# Patient Record
Sex: FEMALE | Race: WHITE
[De-identification: names, ages, dates, MRNs, and addresses within clinical notes are randomized per-mention and may not be internally consistent; named-entity substitution may affect disease eponyms.]

---

## 2018-09-24 ENCOUNTER — HOSPITAL ENCOUNTER (OUTPATIENT)
Dept: HOSPITAL 84 - D.US | Age: 63
Discharge: HOME | End: 2018-09-24
Attending: INTERNAL MEDICINE
Payer: COMMERCIAL

## 2018-09-24 VITALS — BODY MASS INDEX: 37.4 KG/M2

## 2018-09-24 DIAGNOSIS — R12: Primary | ICD-10-CM

## 2018-09-24 DIAGNOSIS — R79.89: ICD-10-CM

## 2018-09-24 DIAGNOSIS — R13.10: ICD-10-CM

## 2018-09-24 LAB
ALBUMIN SERPL-MCNC: 3.4 G/DL (ref 3.4–5)
ALP SERPL-CCNC: 119 U/L (ref 46–116)
ALT SERPL-CCNC: 179 U/L (ref 10–68)
BILIRUB DIRECT SERPL-MCNC: 0.32 MG/DL (ref 0–0.3)
BILIRUB INDIRECT SERPL-MCNC: 0.6 MG/DL (ref 0–1)
BILIRUB SERPL-MCNC: 0.92 MG/DL (ref 0.2–1.3)
GLOBULIN SER-MCNC: 4.5 G/L
PROT SERPL-MCNC: 7.9 G/DL (ref 6.4–8.2)

## 2018-09-25 LAB — HCV AB S/CO SERPL IA: <0.1 (ref 0–0.9)

## 2018-10-01 ENCOUNTER — HOSPITAL ENCOUNTER (OUTPATIENT)
Dept: HOSPITAL 84 - D.LAB | Age: 63
Discharge: HOME | End: 2018-10-01
Attending: INTERNAL MEDICINE
Payer: COMMERCIAL

## 2018-10-01 VITALS — BODY MASS INDEX: 44.9 KG/M2 | BODY MASS INDEX: 37.4 KG/M2

## 2018-10-01 DIAGNOSIS — R79.89: Primary | ICD-10-CM

## 2018-10-01 LAB
ANION GAP SERPL CALC-SCNC: 10.1 MMOL/L (ref 8–16)
APTT BLD: 33.4 SECONDS (ref 22.8–39.4)
BASOPHILS NFR BLD AUTO: 0.4 % (ref 0–2)
BUN SERPL-MCNC: 9 MG/DL (ref 7–18)
CALCIUM SERPL-MCNC: 8.9 MG/DL (ref 8.5–10.1)
CHLORIDE SERPL-SCNC: 103 MMOL/L (ref 98–107)
CHOLEST/HDLC SERPL: 2.6 RATIO (ref 2.3–4.1)
CO2 SERPL-SCNC: 28.7 MMOL/L (ref 21–32)
CREAT SERPL-MCNC: 1.1 MG/DL (ref 0.6–1.3)
EOSINOPHIL NFR BLD: 2.9 % (ref 0–7)
ERYTHROCYTE [DISTWIDTH] IN BLOOD BY AUTOMATED COUNT: 14.1 % (ref 11.5–14.5)
FERRITIN SERPL-MCNC: 1259 NG/ML (ref 3–244)
GGT SERPL-CCNC: 99 U/L (ref 5–85)
GLUCOSE SERPL-MCNC: 127 MG/DL (ref 74–106)
HCT VFR BLD CALC: 41 % (ref 36–48)
HDLC SERPL-MCNC: 38 MG/DL (ref 32–96)
HGB BLD-MCNC: 14.5 G/DL (ref 12–16)
IMM GRANULOCYTES NFR BLD: 0 % (ref 0–5)
INR PPP: 1.24 (ref 0.85–1.17)
IRON SERPL-MCNC: 77 UG/DL (ref 35–150)
LDL-HDL RATIO: 1.1 RATIO (ref 1.5–3.5)
LDLC SERPL-MCNC: 42 MG/DL (ref 0–100)
LYMPHOCYTES NFR BLD AUTO: 24.1 % (ref 15–50)
MCH RBC QN AUTO: 32.6 PG (ref 26–34)
MCHC RBC AUTO-ENTMCNC: 35.4 G/DL (ref 31–37)
MCV RBC: 92.1 FL (ref 80–100)
MONOCYTES NFR BLD: 8.1 % (ref 2–11)
NEUTROPHILS NFR BLD AUTO: 64.5 % (ref 40–80)
OSMOLALITY SERPL CALC.SUM OF ELEC: 276 MOSM/KG (ref 275–300)
PLATELET # BLD: 205 10X3/UL (ref 130–400)
PMV BLD AUTO: 11 FL (ref 7.4–10.4)
POTASSIUM SERPL-SCNC: 3.8 MMOL/L (ref 3.5–5.1)
PROTHROMBIN TIME: 15.2 SECONDS (ref 11.6–15)
RBC # BLD AUTO: 4.45 10X6/UL (ref 4–5.4)
SAO2 % BLD FROM PO2: 27 % (ref 15–55)
SODIUM SERPL-SCNC: 138 MMOL/L (ref 136–145)
TIBC SERPL-MCNC: 281 UG/DL (ref 260–445)
TRIGL SERPL-MCNC: 93 MG/DL (ref 30–200)
UIBC SERPL-MCNC: 204 UG/DL (ref 150–375)
WBC # BLD AUTO: 5.4 10X3/UL (ref 4.8–10.8)

## 2018-10-02 LAB
AFP-TM SERPL-MCNC: 7.8 NG/ML (ref 0–8.3)
FOLATE SERPL-MCNC: 11 NG/ML (ref 3–?)
HAPTOGLOB SERPL-MCNC: 145 MG/DL (ref 34–200)
VIT B12 SERPL-MCNC: 847 PG/ML (ref 232–1245)

## 2018-10-05 LAB — MITOCHONDRIA M2 IGG SER-ACNC: 14 UNITS (ref 0–20)

## 2018-10-09 LAB — ACTIN IGG SERPL-ACNC: (no result)

## 2018-10-17 ENCOUNTER — HOSPITAL ENCOUNTER (OUTPATIENT)
Dept: HOSPITAL 84 - D.SP | Age: 63
Discharge: HOME | End: 2018-10-17
Attending: INTERNAL MEDICINE
Payer: COMMERCIAL

## 2018-10-17 VITALS — BODY MASS INDEX: 44.85 KG/M2 | HEIGHT: 59 IN | WEIGHT: 222.47 LBS | BODY MASS INDEX: 44.85 KG/M2

## 2018-10-17 VITALS — SYSTOLIC BLOOD PRESSURE: 101 MMHG | DIASTOLIC BLOOD PRESSURE: 75 MMHG

## 2018-10-17 DIAGNOSIS — K74.0: ICD-10-CM

## 2018-10-17 DIAGNOSIS — K75.81: Primary | ICD-10-CM

## 2018-10-17 DIAGNOSIS — Z01.812: ICD-10-CM

## 2018-10-17 LAB
ANION GAP SERPL CALC-SCNC: 12.4 MMOL/L (ref 8–16)
APTT BLD: 29 SECONDS (ref 22.8–39.4)
BASOPHILS NFR BLD AUTO: 0.3 % (ref 0–2)
BUN SERPL-MCNC: 13 MG/DL (ref 7–18)
CALCIUM SERPL-MCNC: 9.4 MG/DL (ref 8.5–10.1)
CHLORIDE SERPL-SCNC: 104 MMOL/L (ref 98–107)
CO2 SERPL-SCNC: 26.6 MMOL/L (ref 21–32)
CREAT SERPL-MCNC: 1.1 MG/DL (ref 0.6–1.3)
EOSINOPHIL NFR BLD: 4 % (ref 0–7)
ERYTHROCYTE [DISTWIDTH] IN BLOOD BY AUTOMATED COUNT: 13.8 % (ref 11.5–14.5)
GLUCOSE SERPL-MCNC: 123 MG/DL (ref 74–106)
HCT VFR BLD CALC: 40.3 % (ref 36–48)
HGB BLD-MCNC: 14.1 G/DL (ref 12–16)
IMM GRANULOCYTES NFR BLD: 0.3 % (ref 0–5)
INR PPP: 1.1 (ref 0.85–1.17)
LYMPHOCYTES NFR BLD AUTO: 31.6 % (ref 15–50)
MCH RBC QN AUTO: 32.6 PG (ref 26–34)
MCHC RBC AUTO-ENTMCNC: 35 G/DL (ref 31–37)
MCV RBC: 93.1 FL (ref 80–100)
MONOCYTES NFR BLD: 8.5 % (ref 2–11)
NEUTROPHILS NFR BLD AUTO: 55.3 % (ref 40–80)
OSMOLALITY SERPL CALC.SUM OF ELEC: 278 MOSM/KG (ref 275–300)
PLATELET # BLD: 243 10X3/UL (ref 130–400)
PMV BLD AUTO: 10.6 FL (ref 7.4–10.4)
POTASSIUM SERPL-SCNC: 4 MMOL/L (ref 3.5–5.1)
PROTHROMBIN TIME: 13.8 SECONDS (ref 11.6–15)
RBC # BLD AUTO: 4.33 10X6/UL (ref 4–5.4)
SODIUM SERPL-SCNC: 139 MMOL/L (ref 136–145)
WBC # BLD AUTO: 5.8 10X3/UL (ref 4.8–10.8)

## 2019-03-26 ENCOUNTER — HOSPITAL ENCOUNTER (OUTPATIENT)
Dept: HOSPITAL 84 - D.US | Age: 64
Discharge: HOME | End: 2019-03-26
Attending: INTERNAL MEDICINE
Payer: COMMERCIAL

## 2019-03-26 VITALS — BODY MASS INDEX: 44.9 KG/M2

## 2019-03-26 DIAGNOSIS — K76.0: Primary | ICD-10-CM

## 2019-03-26 LAB
ALBUMIN SERPL-MCNC: 3.2 G/DL (ref 3.4–5)
ALP SERPL-CCNC: 103 U/L (ref 46–116)
ALT SERPL-CCNC: 54 U/L (ref 10–68)
BILIRUB DIRECT SERPL-MCNC: 0.12 MG/DL (ref 0–0.3)
BILIRUB INDIRECT SERPL-MCNC: 0.61 MG/DL (ref 0–1)
BILIRUB SERPL-MCNC: 0.73 MG/DL (ref 0.2–1.3)
GLOBULIN SER-MCNC: 4.3 G/L
PROT SERPL-MCNC: 7.5 G/DL (ref 6.4–8.2)

## 2019-04-01 ENCOUNTER — HOSPITAL ENCOUNTER (OUTPATIENT)
Dept: HOSPITAL 84 - D.US | Age: 64
Discharge: HOME | End: 2019-04-01
Attending: INTERNAL MEDICINE
Payer: COMMERCIAL

## 2019-04-01 VITALS — BODY MASS INDEX: 44.9 KG/M2

## 2019-04-01 DIAGNOSIS — K76.0: Primary | ICD-10-CM

## 2019-09-23 ENCOUNTER — HOSPITAL ENCOUNTER (OUTPATIENT)
Dept: HOSPITAL 84 - D.OPS | Age: 64
Discharge: HOME | End: 2019-09-23
Attending: INTERNAL MEDICINE
Payer: COMMERCIAL

## 2019-09-23 VITALS — WEIGHT: 226.47 LBS | HEIGHT: 59 IN | BODY MASS INDEX: 45.65 KG/M2 | BODY MASS INDEX: 45.65 KG/M2

## 2019-09-23 VITALS — DIASTOLIC BLOOD PRESSURE: 80 MMHG | SYSTOLIC BLOOD PRESSURE: 129 MMHG

## 2019-09-23 DIAGNOSIS — K44.9: ICD-10-CM

## 2019-09-23 DIAGNOSIS — R13.10: ICD-10-CM

## 2019-09-23 DIAGNOSIS — K21.0: Primary | ICD-10-CM

## 2019-09-23 LAB
ANION GAP SERPL CALC-SCNC: 14.3 MMOL/L (ref 8–16)
BUN SERPL-MCNC: 12 MG/DL (ref 7–18)
CALCIUM SERPL-MCNC: 9.3 MG/DL (ref 8.5–10.1)
CHLORIDE SERPL-SCNC: 103 MMOL/L (ref 98–107)
CO2 SERPL-SCNC: 28 MMOL/L (ref 21–32)
CREAT SERPL-MCNC: 1 MG/DL (ref 0.6–1.3)
ERYTHROCYTE [DISTWIDTH] IN BLOOD BY AUTOMATED COUNT: 13.5 % (ref 11.5–14.5)
GLUCOSE SERPL-MCNC: 126 MG/DL (ref 74–106)
HCT VFR BLD CALC: 41.4 % (ref 36–48)
HGB BLD-MCNC: 14.9 G/DL (ref 12–16)
MCH RBC QN AUTO: 32 PG (ref 26–34)
MCHC RBC AUTO-ENTMCNC: 36 G/DL (ref 31–37)
MCV RBC: 89 FL (ref 80–100)
OSMOLALITY SERPL CALC.SUM OF ELEC: 282 MOSM/KG (ref 275–300)
PLATELET # BLD: 171 10X3/UL (ref 130–400)
PMV BLD AUTO: 10.6 FL (ref 7.4–10.4)
POTASSIUM SERPL-SCNC: 4.3 MMOL/L (ref 3.5–5.1)
RBC # BLD AUTO: 4.65 10X6/UL (ref 4–5.4)
SODIUM SERPL-SCNC: 141 MMOL/L (ref 136–145)
WBC # BLD AUTO: 5.2 10X3/UL (ref 4.8–10.8)

## 2019-10-02 ENCOUNTER — HOSPITAL ENCOUNTER (OUTPATIENT)
Dept: HOSPITAL 84 - D.US | Age: 64
Discharge: HOME | End: 2019-10-02
Attending: INTERNAL MEDICINE
Payer: COMMERCIAL

## 2019-10-02 VITALS — BODY MASS INDEX: 45.7 KG/M2

## 2019-10-02 DIAGNOSIS — K76.0: Primary | ICD-10-CM

## 2019-10-02 LAB
ALBUMIN SERPL-MCNC: 3.5 G/DL (ref 3.4–5)
ALP SERPL-CCNC: 108 U/L (ref 46–116)
ALT SERPL-CCNC: 79 U/L (ref 10–68)
BILIRUB DIRECT SERPL-MCNC: 0.18 MG/DL (ref 0–0.3)
BILIRUB INDIRECT SERPL-MCNC: 0.55 MG/DL (ref 0–1)
BILIRUB SERPL-MCNC: 0.73 MG/DL (ref 0.2–1.3)
GLOBULIN SER-MCNC: 4.5 G/L
PROT SERPL-MCNC: 8 G/DL (ref 6.4–8.2)

## 2019-10-02 NOTE — OP
PATIENT NAME:  KELTON RUANO                   MEDICAL RECORD: [id number]
:59                                             LOCATION:D.OPS          
                                                         ADMISSION DATE:        
SURGEON:  JONO BERRIOS DO             
 
 
DATE OF OPERATION:  2019
 
PROCEDURE:  EGD with biopsies.
 
INDICATIONS FOR PROCEDURE:  Dysphagia and abnormal barium swallow with concern
for a mass in the mid esophagus.
 
SCOPE:  Olympus video gastroscope.
 
MEDICATIONS:  Propofol 100 mg IV per anesthesia.
 
ESTIMATED BLOOD LOSS:  Minimal.
 
COMPLICATIONS:  None.
 
FINDINGS:  Informed consent was given.  The patient was made comfortable with
the above medication.  After reaching an adequate level of sedation by slow IV
push, the patient was placed on her left side.  The endoscope was advanced under
direct visualization through the mouth to the second portion of the duodenum
with ease.  The entire esophagus appeared normal.  There were no rings,
strictures, mass or other abnormalities encountered.  At the GE junction, there
were minor changes consistent with LA class A reflux-induced esophagitis. 
Because the patient has had past biopsies, which showed intestinal metaplasia,
biopsies were taken at the squamocolumnar junction, although this did not
necessarily look like Whitney's esophagus.  The endoscope was advanced beyond
the GE junction into the stomach and retroflexed to view the cardia, where a
very small sliding hiatal hernia was visualized.  The fundus and body of the
stomach appeared normal as did the antrum and prepyloric region.  Cold forceps
biopsies were taken from the antrum to submit for histopathology and to rule out
the presence of H. pylori, which the patient has tested positive for in the
past.  The endoscope was advanced beyond the pylorus in the duodenum, which
appeared normal to the second portion.  The endoscope was then withdrawn from
the patient.  The patient tolerated the procedure well and there were no
complications.
 
IMPRESSION:
1.  LA class A reflux-induced esophagitis.
2.  Small sliding hiatal hernia.
3.  Otherwise, normal upper endoscopy with no evidence for a mass in the mid
esophagus as suggested by the barium esophagram within the past few weeks.
 
PLAN AND RECOMMENDATIONS:
1.  Discharge home when recovery parameters are met.
2.  Follow up biopsy specimen results.
3.  GERD diet and reflux precautions.
4.  Continue current medications.
5.  We will discuss a manometry study with the patient in order if she believes
she can tolerate the testing.
6.  Further recommendations to follow manometry testing.
 
TRANSINT:[id number] Voice Confirmation ID: [id number] DOCUMENT ID: [id number]
 
 
 
OPERATIVE REPORT                               [id number]    KELTON RUANO NATHAN A DO             
 
 
 
Electronically Signed by JONO SIMEON on 10/02/19 at 1644
 
 
 
 
 
 
 
 
 
 
 
 
 
 
 
 
 
 
 
 
 
 
 
 
 
 
 
 
 
 
 
 
 
 
 
 
 
 
 
 
 
CC:                                                             [id number]
DICTATION DATE: 19 1529     :     19 0115      [facility] 
                                                                      [id number] [place] [id number]

## 2020-08-04 ENCOUNTER — HOSPITAL ENCOUNTER (OUTPATIENT)
Dept: HOSPITAL 84 - D.US | Age: 65
Discharge: HOME | End: 2020-08-04
Attending: INTERNAL MEDICINE
Payer: COMMERCIAL

## 2020-08-04 VITALS — BODY MASS INDEX: 45.7 KG/M2

## 2020-08-04 DIAGNOSIS — K76.0: Primary | ICD-10-CM

## 2020-08-04 LAB
ALBUMIN SERPL-MCNC: 3.5 G/DL (ref 3.4–5)
ALP SERPL-CCNC: 77 U/L (ref 30–120)
ALT SERPL-CCNC: 60 U/L (ref 10–68)
BILIRUB DIRECT SERPL-MCNC: 0.25 MG/DL (ref 0–0.3)
BILIRUB INDIRECT SERPL-MCNC: 0.62 MG/DL (ref 0–1)
BILIRUB SERPL-MCNC: 0.87 MG/DL (ref 0.2–1.3)
GLOBULIN SER-MCNC: 4.2 G/L
PROT SERPL-MCNC: 7.7 G/DL (ref 6.4–8.2)

## 2020-10-05 ENCOUNTER — HOSPITAL ENCOUNTER (OUTPATIENT)
Dept: HOSPITAL 84 - D.OPS | Age: 65
Discharge: HOME | End: 2020-10-05
Attending: INTERNAL MEDICINE
Payer: COMMERCIAL

## 2020-10-05 VITALS — BODY MASS INDEX: 45.05 KG/M2 | WEIGHT: 223.47 LBS | HEIGHT: 59 IN

## 2020-10-05 DIAGNOSIS — K57.30: ICD-10-CM

## 2020-10-05 DIAGNOSIS — K63.5: ICD-10-CM

## 2020-10-05 DIAGNOSIS — K76.0: ICD-10-CM

## 2020-10-05 DIAGNOSIS — Z86.010: Primary | ICD-10-CM

## 2020-10-05 LAB
ANION GAP SERPL CALC-SCNC: 9.9 MMOL/L (ref 8–16)
BUN SERPL-MCNC: 9 MG/DL (ref 7–18)
CALCIUM SERPL-MCNC: 9.6 MG/DL (ref 8.5–10.1)
CHLORIDE SERPL-SCNC: 104 MMOL/L (ref 98–107)
CO2 SERPL-SCNC: 27.1 MMOL/L (ref 21–32)
CREAT SERPL-MCNC: 1.1 MG/DL (ref 0.6–1.3)
ERYTHROCYTE [DISTWIDTH] IN BLOOD BY AUTOMATED COUNT: 12.7 % (ref 11.5–14.5)
GLUCOSE SERPL-MCNC: 117 MG/DL (ref 74–106)
HCT VFR BLD CALC: 42.1 % (ref 36–48)
HGB BLD-MCNC: 14.2 G/DL (ref 12–16)
MCH RBC QN AUTO: 31.8 PG (ref 26–34)
MCHC RBC AUTO-ENTMCNC: 33.7 G/DL (ref 31–37)
MCV RBC: 94.2 FL (ref 80–100)
OSMOLALITY SERPL CALC.SUM OF ELEC: 273 MOSM/KG (ref 275–300)
PLATELET # BLD: 194 10X3/UL (ref 130–400)
PMV BLD AUTO: 10.7 FL (ref 7.4–10.4)
POTASSIUM SERPL-SCNC: 4 MMOL/L (ref 3.5–5.1)
RBC # BLD AUTO: 4.47 10X6/UL (ref 4–5.4)
SODIUM SERPL-SCNC: 137 MMOL/L (ref 136–145)
WBC # BLD AUTO: 6.2 10X3/UL (ref 4.8–10.8)

## 2020-10-05 NOTE — OP
PATIENT NAME:  KELTON RUANO                   MEDICAL RECORD: [id number]
:59                                             LOCATION:D.OPS          
                                                         ADMISSION DATE:        
SURGEON:  JONO BERRIOS DO             
 
 
DATE OF OPERATION:  10/05/2020
 
DATE OF PROCEDURE:  10/05/2020.
 
DICTATING PHYSICIAN:  Jono Berrois DO
 
PROCEDURE:  Colonoscopy with polypectomies.
 
INDICATIONS FOR PROCEDURE:  History of colon polyps and diverticulosis.  The
patient's last colonoscopy was on 10/29/2018.
 
SCOPE:  Olympus video pediatric colonoscope.
 
MEDICATIONS:  Propofol 290 mg IV per anesthesia.
 
WITHDRAWAL TIME:  17 minutes.
 
ESTIMATED BLOOD LOSS:  Minimal.
 
COMPLICATIONS:  None.
 
FINDINGS:  Informed consent was given.  The patient was made comfortable with
the above medication.  After reaching an adequate level of sedation by slow IV
push, the patient was placed on her left side.  A digital rectal examination was
performed and was normal.  The endoscope was then advanced under direct
visualization through the rectum to the cecum.  The appendiceal orifice and
ileocecal valve were identified.  There were 2 polyps visualized in the cecum. 
One was a benign appearing sessile polyp, which measured approximately 2-3 mm in
diameter.  The second was a benign appearing sessile polyp, which measured
approximately 4-5 mm in diameter.  The larger polyp was removed using a hot
snare and the smaller polyp was removed using hot forceps.  In the ascending
colon, there were two separate benign-appearing sessile polyps hidden behind
folds which were only seen upon retroflexion.  They were both removed using hot
snare.  The larger of the 2 measured approximately 5-6 mm in diameter and the
smaller polyp measured approximately 4-5 mm in diameter.  There was a benign
appearing sessile polyp located in the transverse colon which measured
approximately 5 mm in diameter, which was removed using hot snare.  In the
sigmoid colon, there was a benign appearing sessile polyp, which measured
approximately 4 mm in diameter, which was removed using a hot snare.  There was
evidence of moderate diverticulosis involving the descending and sigmoid colon. 
Retroflexion was performed in the rectum with visualization of a normal
appearing rectal wall.  The endoscope was withdrawn from the patient.  The
patient tolerated the procedure well and there were no complications.
 
IMPRESSION:
1.  Moderate diverticulosis of the descending and sigmoid colon.
2.  Multiple polyps as described above, removed using a combination of a hot
snare and hot forceps.
 
PLAN AND RECOMMENDATIONS:
1.  Discharge home when recovery parameters are met.
2.  Follow up biopsy specimen results.
 
 
 
OPERATIVE REPORT                               [id number]    KELTON RUANO 
 
 
3.  High fiber diet.
4.  Continue current medications.
5.  Recall colonoscopy in 3 years.
 
TRANSINT:[id number] Voice Confirmation ID: [id number] DOCUMENT ID: [id number]
                                           
                                           AGUSTINAJONO SAIGE RICKS             
 
 
 
 
 
 
 
 
 
 
 
 
 
 
 
 
 
 
 
 
 
 
 
 
 
 
 
 
 
 
 
 
 
 
 
 
 
 
 
 
CC:                                                             [id number]
DICTATION DATE: 10/05/20 1005     :     10/05/20 2047      [facility] 
                                                                      10/05/20
[address] [place] [address]

## 2024-12-03 NOTE — NUR
DISCHARGE INSTRUCTIONS REVIEWED WITH PATIENT AND SPOUSE. DISCHARGED
HOME VIA WHEELCHAIR TO PRIVATE VEHICLE WITH SPOUSE Ms. Trinidad